# Patient Record
Sex: FEMALE | Race: WHITE | NOT HISPANIC OR LATINO | URBAN - METROPOLITAN AREA
[De-identification: names, ages, dates, MRNs, and addresses within clinical notes are randomized per-mention and may not be internally consistent; named-entity substitution may affect disease eponyms.]

---

## 2017-01-06 ENCOUNTER — FOLLOW UP ESTABLISHED (OUTPATIENT)
Dept: URBAN - METROPOLITAN AREA CLINIC 60 | Facility: CLINIC | Age: 67
End: 2017-01-06
Payer: COMMERCIAL

## 2017-01-06 PROCEDURE — 99213 OFFICE O/P EST LOW 20 MIN: CPT | Performed by: OPHTHALMOLOGY

## 2017-01-06 PROCEDURE — 92020 GONIOSCOPY: CPT | Performed by: OPHTHALMOLOGY

## 2017-01-06 ASSESSMENT — INTRAOCULAR PRESSURE
OS: 20
OD: 21

## 2017-10-30 ENCOUNTER — FOLLOW UP ESTABLISHED (OUTPATIENT)
Dept: URBAN - METROPOLITAN AREA CLINIC 60 | Facility: CLINIC | Age: 67
End: 2017-10-30
Payer: COMMERCIAL

## 2017-10-30 DIAGNOSIS — H25.13 AGE-RELATED NUCLEAR CATARACT, BILATERAL: Primary | ICD-10-CM

## 2017-10-30 DIAGNOSIS — H04.123 TEAR FILM INSUFFICIENCY OF BILATERAL LACRIMAL GLANDS: ICD-10-CM

## 2017-10-30 PROCEDURE — 92014 COMPRE OPH EXAM EST PT 1/>: CPT | Performed by: OPHTHALMOLOGY

## 2017-10-30 PROCEDURE — 92133 CPTRZD OPH DX IMG PST SGM ON: CPT | Performed by: OPHTHALMOLOGY

## 2017-10-30 PROCEDURE — 83861 MICROFLUID ANALY TEARS: CPT | Performed by: OPHTHALMOLOGY

## 2017-10-30 PROCEDURE — 92015 DETERMINE REFRACTIVE STATE: CPT | Performed by: OPHTHALMOLOGY

## 2017-10-30 ASSESSMENT — INTRAOCULAR PRESSURE
OD: 20
OS: 19

## 2017-10-30 ASSESSMENT — VISUAL ACUITY
OD: 20/20
OS: 20/20

## 2018-04-16 ENCOUNTER — FOLLOW UP ESTABLISHED (OUTPATIENT)
Dept: URBAN - METROPOLITAN AREA CLINIC 60 | Facility: CLINIC | Age: 68
End: 2018-04-16
Payer: COMMERCIAL

## 2018-04-16 PROCEDURE — 92083 EXTENDED VISUAL FIELD XM: CPT | Performed by: OPHTHALMOLOGY

## 2018-04-16 PROCEDURE — 92014 COMPRE OPH EXAM EST PT 1/>: CPT | Performed by: OPHTHALMOLOGY

## 2018-04-16 PROCEDURE — 92250 FUNDUS PHOTOGRAPHY W/I&R: CPT | Performed by: OPHTHALMOLOGY

## 2018-04-16 ASSESSMENT — INTRAOCULAR PRESSURE
OD: 21
OS: 21

## 2019-11-01 ENCOUNTER — FOLLOW UP ESTABLISHED (OUTPATIENT)
Dept: URBAN - METROPOLITAN AREA CLINIC 60 | Facility: CLINIC | Age: 69
End: 2019-11-01
Payer: COMMERCIAL

## 2019-11-01 DIAGNOSIS — H25.813 COMBINED FORMS OF AGE-RELATED CATARACT, BILATERAL: ICD-10-CM

## 2019-11-01 PROCEDURE — 92083 EXTENDED VISUAL FIELD XM: CPT | Performed by: OPHTHALMOLOGY

## 2019-11-01 PROCEDURE — 92133 CPTRZD OPH DX IMG PST SGM ON: CPT | Performed by: OPHTHALMOLOGY

## 2019-11-01 PROCEDURE — 92015 DETERMINE REFRACTIVE STATE: CPT | Performed by: OPHTHALMOLOGY

## 2019-11-01 PROCEDURE — 92014 COMPRE OPH EXAM EST PT 1/>: CPT | Performed by: OPHTHALMOLOGY

## 2019-11-01 ASSESSMENT — VISUAL ACUITY
OD: 20/20
OS: 20/25

## 2019-11-01 ASSESSMENT — INTRAOCULAR PRESSURE
OS: 20
OD: 20

## 2021-12-02 ENCOUNTER — OFFICE VISIT (OUTPATIENT)
Dept: URBAN - METROPOLITAN AREA CLINIC 59 | Facility: CLINIC | Age: 71
End: 2021-12-02
Payer: COMMERCIAL

## 2021-12-02 DIAGNOSIS — H52.4 PRESBYOPIA: ICD-10-CM

## 2021-12-02 DIAGNOSIS — H40.053 OCULAR HYPERTENSION, BILATERAL: Primary | ICD-10-CM

## 2021-12-02 DIAGNOSIS — H52.13 MYOPIA, BILATERAL: ICD-10-CM

## 2021-12-02 PROCEDURE — 92004 COMPRE OPH EXAM NEW PT 1/>: CPT | Performed by: OPTOMETRIST

## 2021-12-02 PROCEDURE — 92133 CPTRZD OPH DX IMG PST SGM ON: CPT | Performed by: OPTOMETRIST

## 2021-12-02 ASSESSMENT — VISUAL ACUITY
OS: 20/20
OD: 20/20

## 2021-12-02 ASSESSMENT — INTRAOCULAR PRESSURE
OD: 21
OS: 20

## 2021-12-02 NOTE — IMPRESSION/PLAN
Impression: Ocular hypertension, bilateral: H40.053. Plan: IOP stable today. RNFL OCT done today. Continue to monitor with out treatment. RTC in 1 year CE RNFL. OCT OD: ave t 73. borderline sup and inf OS: ave t 79.  borderline temporal

## 2021-12-02 NOTE — IMPRESSION/PLAN
Impression: Presbyopia: H52.4. Plan: New glasses Rx was given today. Patient was educated on myopic shift.

## 2022-12-01 ENCOUNTER — OFFICE VISIT (OUTPATIENT)
Dept: URBAN - METROPOLITAN AREA CLINIC 59 | Facility: CLINIC | Age: 72
End: 2022-12-01
Payer: MEDICARE

## 2022-12-01 DIAGNOSIS — H25.13 AGE-RELATED NUCLEAR CATARACT, BILATERAL: ICD-10-CM

## 2022-12-01 DIAGNOSIS — H40.053 OCULAR HYPERTENSION, BILATERAL: Primary | ICD-10-CM

## 2022-12-01 DIAGNOSIS — H52.4 PRESBYOPIA: ICD-10-CM

## 2022-12-01 DIAGNOSIS — H52.13 MYOPIA, BILATERAL: ICD-10-CM

## 2022-12-01 PROCEDURE — 92014 COMPRE OPH EXAM EST PT 1/>: CPT | Performed by: OPTOMETRIST

## 2022-12-01 PROCEDURE — 92133 CPTRZD OPH DX IMG PST SGM ON: CPT | Performed by: OPTOMETRIST

## 2022-12-01 ASSESSMENT — VISUAL ACUITY
OD: 20/25
OS: 20/25

## 2022-12-01 ASSESSMENT — INTRAOCULAR PRESSURE
OS: 21
OD: 22

## 2022-12-01 NOTE — IMPRESSION/PLAN
Impression: Ocular hypertension, bilateral: H40.053. Plan: IOP stable. RNFL was done today. Patient educated on findings. Will continue to monitor patient without treatment. Patient to RTC in 1 year for complete exam and RNFL OCT. RNFL 
OD: ave t 72 borderline inferior- no significant changes OS: ave t 73 borderline superior and inferior (slightly thinner).

## 2023-12-01 ENCOUNTER — OFFICE VISIT (OUTPATIENT)
Dept: URBAN - METROPOLITAN AREA CLINIC 59 | Facility: CLINIC | Age: 73
End: 2023-12-01
Payer: MEDICARE

## 2023-12-01 DIAGNOSIS — H52.4 PRESBYOPIA: ICD-10-CM

## 2023-12-01 DIAGNOSIS — H43.813 VITREOUS DEGENERATION, BILATERAL: ICD-10-CM

## 2023-12-01 DIAGNOSIS — H25.13 AGE-RELATED NUCLEAR CATARACT, BILATERAL: ICD-10-CM

## 2023-12-01 DIAGNOSIS — H40.053 OCULAR HYPERTENSION, BILATERAL: Primary | ICD-10-CM

## 2023-12-01 PROCEDURE — 92014 COMPRE OPH EXAM EST PT 1/>: CPT | Performed by: OPTOMETRIST

## 2023-12-01 PROCEDURE — 92133 CPTRZD OPH DX IMG PST SGM ON: CPT | Performed by: OPTOMETRIST

## 2023-12-01 ASSESSMENT — INTRAOCULAR PRESSURE
OD: 21
OS: 21

## 2023-12-01 ASSESSMENT — VISUAL ACUITY
OD: 20/25
OS: 20/25